# Patient Record
Sex: MALE | Race: WHITE | Employment: UNEMPLOYED | URBAN - METROPOLITAN AREA
[De-identification: names, ages, dates, MRNs, and addresses within clinical notes are randomized per-mention and may not be internally consistent; named-entity substitution may affect disease eponyms.]

---

## 2021-02-08 PROCEDURE — 99284 EMERGENCY DEPT VISIT MOD MDM: CPT

## 2021-02-09 ENCOUNTER — HOSPITAL ENCOUNTER (EMERGENCY)
Age: 26
Discharge: PSYCHIATRIC OR SUBSTANCE USE FACILITY | End: 2021-02-09
Attending: EMERGENCY MEDICINE

## 2021-02-09 VITALS
SYSTOLIC BLOOD PRESSURE: 111 MMHG | BODY MASS INDEX: 19.5 KG/M2 | OXYGEN SATURATION: 97 % | WEIGHT: 144 LBS | RESPIRATION RATE: 18 BRPM | TEMPERATURE: 98.2 F | HEIGHT: 72 IN | HEART RATE: 98 BPM | DIASTOLIC BLOOD PRESSURE: 69 MMHG

## 2021-02-09 DIAGNOSIS — R45.851 SUICIDAL IDEATION: Primary | ICD-10-CM

## 2021-02-09 LAB
ALBUMIN SERPL-MCNC: 4.5 G/DL (ref 3.4–5)
ALBUMIN/GLOB SERPL: 1.4 {RATIO} (ref 0.8–1.7)
ALP SERPL-CCNC: 79 U/L (ref 45–117)
ALT SERPL-CCNC: 29 U/L (ref 16–61)
AMPHET UR QL SCN: NEGATIVE
ANION GAP SERPL CALC-SCNC: 3 MMOL/L (ref 3–18)
AST SERPL-CCNC: 12 U/L (ref 10–38)
BARBITURATES UR QL SCN: NEGATIVE
BASOPHILS # BLD: 0 K/UL (ref 0–0.1)
BASOPHILS NFR BLD: 1 % (ref 0–2)
BENZODIAZ UR QL: NEGATIVE
BILIRUB SERPL-MCNC: 0.4 MG/DL (ref 0.2–1)
BUN SERPL-MCNC: 14 MG/DL (ref 7–18)
BUN/CREAT SERPL: 14 (ref 12–20)
CALCIUM SERPL-MCNC: 8.9 MG/DL (ref 8.5–10.1)
CANNABINOIDS UR QL SCN: NEGATIVE
CHLORIDE SERPL-SCNC: 106 MMOL/L (ref 100–111)
CO2 SERPL-SCNC: 31 MMOL/L (ref 21–32)
COCAINE UR QL SCN: NEGATIVE
COVID-19 RAPID TEST, COVR: NOT DETECTED
CREAT SERPL-MCNC: 1 MG/DL (ref 0.6–1.3)
DIFFERENTIAL METHOD BLD: NORMAL
EOSINOPHIL # BLD: 0.2 K/UL (ref 0–0.4)
EOSINOPHIL NFR BLD: 3 % (ref 0–5)
ERYTHROCYTE [DISTWIDTH] IN BLOOD BY AUTOMATED COUNT: 11.9 % (ref 11.6–14.5)
ETHANOL SERPL-MCNC: <3 MG/DL (ref 0–3)
GLOBULIN SER CALC-MCNC: 3.2 G/DL (ref 2–4)
GLUCOSE SERPL-MCNC: 83 MG/DL (ref 74–99)
HCT VFR BLD AUTO: 41 % (ref 36–48)
HDSCOM,HDSCOM: NORMAL
HGB BLD-MCNC: 14.7 G/DL (ref 13–16)
LYMPHOCYTES # BLD: 2.1 K/UL (ref 0.9–3.6)
LYMPHOCYTES NFR BLD: 39 % (ref 21–52)
MCH RBC QN AUTO: 30.4 PG (ref 24–34)
MCHC RBC AUTO-ENTMCNC: 35.9 G/DL (ref 31–37)
MCV RBC AUTO: 84.7 FL (ref 74–97)
METHADONE UR QL: NEGATIVE
MONOCYTES # BLD: 0.4 K/UL (ref 0.05–1.2)
MONOCYTES NFR BLD: 7 % (ref 3–10)
NEUTS SEG # BLD: 2.6 K/UL (ref 1.8–8)
NEUTS SEG NFR BLD: 50 % (ref 40–73)
OPIATES UR QL: NEGATIVE
PCP UR QL: NEGATIVE
PLATELET # BLD AUTO: 219 K/UL (ref 135–420)
PMV BLD AUTO: 9.7 FL (ref 9.2–11.8)
POTASSIUM SERPL-SCNC: 4 MMOL/L (ref 3.5–5.5)
PROT SERPL-MCNC: 7.7 G/DL (ref 6.4–8.2)
RBC # BLD AUTO: 4.84 M/UL (ref 4.7–5.5)
SARS-COV-2, COV2: NORMAL
SODIUM SERPL-SCNC: 140 MMOL/L (ref 136–145)
SOURCE, COVRS: NORMAL
WBC # BLD AUTO: 5.3 K/UL (ref 4.6–13.2)

## 2021-02-09 PROCEDURE — 80053 COMPREHEN METABOLIC PANEL: CPT

## 2021-02-09 PROCEDURE — 87635 SARS-COV-2 COVID-19 AMP PRB: CPT

## 2021-02-09 PROCEDURE — 85025 COMPLETE CBC W/AUTO DIFF WBC: CPT

## 2021-02-09 PROCEDURE — 80307 DRUG TEST PRSMV CHEM ANLYZR: CPT

## 2021-02-09 PROCEDURE — 82077 ASSAY SPEC XCP UR&BREATH IA: CPT

## 2021-02-09 NOTE — ED NOTES
TRANSFER - OUT REPORT:    Verbal report given to [employer] (name) on Rhoda Duarte  being transferred to [facility] (unit) for routine progression of care       Report consisted of patients Situation, Background, Assessment and   Recommendations(SBAR). Information from the following report(s) SBAR, ED Summary, Intake/Output, MAR, Recent Results, Alarm Parameters  and Quality Measures was reviewed with the receiving nurse. Lines:       Opportunity for questions and clarification was provided.       Patient transported with:   Byron Mcpherson

## 2021-02-09 NOTE — BSMART NOTE
Spoke with patient. Patient continues to endorse SI. Discussed with patient CSU and patient is voluntary. Previous crisis worker discussed with [facility] on call and patient will be evaluated for CSU.

## 2021-02-09 NOTE — ED TRIAGE NOTES
C/o suicidal ideations and plan tonight. States he always thinks about it but tonight he was going to hang himself with rope that he has at home.  Called [employer] and police came and took him to the ER

## 2021-02-09 NOTE — ED NOTES
Video monitor placed in pt room and  display in view at nurses station.   Pt sticker placed on Suicide Awareness/Precautions Observation Flowsheet

## 2021-02-09 NOTE — BSMART NOTE
Patient seen in [place] [address] at the request of Dr.R. Baldo Desai. Patient reports he called the [facility] hotline regarding suicidal ideations and depression. \"I planned on killing myself. \" He reports the police found him pacing at the [facility]. Patient endorsed suicidal ideations with plan to hang himself. \" I was going to use a noose. \"  He reports he was fired a couple of weeks ago from his job. Although he contemplated suicide three week ago, losing his job intensified his ideations. He denies outpatient or inpatient treatment. He denies medical history. Patient reports drinking wine occasionally. He reports access to knives however he denied legal issues. Mental Status Exam 
  
The patient's appearance appropriate. The patient's behavior guarded. The patient is oriented to person, place, time and situation. The patient's speech is normal. The patient's mood is depressed. The patient's range of affect is flat. The patient's thought content demonstrates no evidence of impairment. The patient's thought process is blaming self. \"I just don't want to be myself anymore. The patient's memory shows no evidence of impairment. The patient's appetite shows no evidence of impairment. The patient's sleep shows no evidence of impairment. The patient's insight poor. The patient's judgement fair. DISPOSITION: Patient is receptive to voluntary admission. Dr. Maeve Byrnes and charge nurse made aware.

## 2021-02-09 NOTE — ED NOTES
[facility] with [facility] of care. Pt transfer to [facility] by Dr. Paulo Trivedi.   Contact number for report [phone]

## 2021-02-09 NOTE — ED NOTES
Bedside shift change report given to [facility] (oncoming nurse) by Sridhar (offgoing nurse). Report included the following information SBAR, ED Summary, Intake/Output, MAR, Recent Results, Alarm Parameters  and Quality Measures. Pt sitting up in bed in no acute distress with unlabored breathing.

## 2021-02-09 NOTE — BSMART NOTE
Crisis Note: Spoke with Ellie Gonzales with [address] regarding referral to [facility]; clinicals faxed awaiting response.

## 2021-02-09 NOTE — ED NOTES
Pt stated that they still have SI with a plan to kill themselves. Became defensive with arms crossed when asked if they were still suicidal and if they had a plan. Pt educated about questions being routine questions but pt did not want to continue with the conversation. Pt given juice and call bell is within reach with door open.

## 2021-02-09 NOTE — BSMART NOTE
Spoke with Mando Otero from [facility], inquired if patient has VA benefits and availability of tranfers to [address] with patient who advised he is a  but was unsure of his benefit status. [facility] and spoke with Braulio Barriga [place] on deferment. Spoke with Mando Otero from [facility] and advised of bed status, patient to be screened for CSU.

## 2021-02-09 NOTE — ED PROVIDER NOTES
EMERGENCY DEPARTMENT HISTORY AND PHYSICAL EXAM    3:26 AM  Date: 2/9/2021  Patient Name: John Cruz    History of Presenting Illness     Chief Complaint   Patient presents with   [address] Problem     wanting to hang himself        History Provided By: Patient    HPI: John Cruz is a 31 y.o. male with no significant past medical problems. Patient is presenting with symptoms of depression and suicidal ideation. Over the past week he has been having suicidal thoughts and plans of hanging himself. He reports that for very long time he is always been feeling depressed and wishing he was dead. He is never seeped psychiatric or mental health evaluation in the past for never been on medications. Recently he lost his job which exacerbated his symptoms. Denies previous suicidal attempts or mental health admission. Admits to drinking alcohol occasionally but no drug use. No physical symptoms. Location:  Severity:  Timing/course: Onset/Duration:     PCP: None    Past History     Past Medical History:  No past medical history on file. Past Surgical History:  No past surgical history on file. Family History:  No family history on file. Social History:  Social History     Tobacco Use    Smoking status: Not on file   Substance Use Topics    Alcohol use: Not on file    Drug use: Not on file       Allergies:  Not on File    Review of Systems   Review of Systems   Psychiatric/Behavioral: Positive for decreased concentration, dysphoric mood, sleep disturbance and suicidal ideas. All other systems reviewed and are negative. Physical Exam     Patient Vitals for the past 12 hrs:   Temp Pulse Resp BP SpO2   02/08/21 2352 98.2 °F (36.8 °C) 94 16 114/77 98 %       Physical Exam  Vitals signs and nursing note reviewed. Constitutional:       Appearance: Normal appearance. HENT:      Head: Normocephalic and atraumatic. Eyes:      Extraocular Movements: Extraocular movements intact. Neck:      Musculoskeletal: Normal range of motion and neck supple. Cardiovascular:      Rate and Rhythm: Normal rate. Pulmonary:      Effort: Pulmonary effort is normal. No respiratory distress. Musculoskeletal: Normal range of motion. General: No deformity. Neurological:      General: No focal deficit present. Mental Status: He is alert and oriented to person, place, and time. Psychiatric:         Attention and Perception: Attention normal.         Mood and Affect: Mood is depressed. Affect is flat. Speech: Speech is delayed. Behavior: Behavior is slowed and withdrawn. Thought Content: Thought content includes suicidal ideation. Cognition and Memory: Cognition normal.         Judgment: Judgment normal.         Diagnostic Study Results     Labs -  Recent Results (from the past 12 hour(s))   CBC WITH AUTOMATED DIFF    Collection Time: 02/09/21 12:09 AM   Result Value Ref Range    WBC 5.3 4.6 - 13.2 K/uL    RBC 4.84 4.70 - 5.50 M/uL    HGB 14.7 13.0 - 16.0 g/dL    HCT 41.0 36.0 - 48.0 %    MCV 84.7 74.0 - 97.0 FL    MCH 30.4 24.0 - 34.0 PG    MCHC 35.9 31.0 - 37.0 g/dL    RDW 11.9 11.6 - 14.5 %    PLATELET 394 771 - 426 K/uL    MPV 9.7 9.2 - 11.8 FL    NEUTROPHILS 50 40 - 73 %    LYMPHOCYTES 39 21 - 52 %    MONOCYTES 7 3 - 10 %    EOSINOPHILS 3 0 - 5 %    BASOPHILS 1 0 - 2 %    ABS. NEUTROPHILS 2.6 1.8 - 8.0 K/UL    ABS. LYMPHOCYTES 2.1 0.9 - 3.6 K/UL    ABS. MONOCYTES 0.4 0.05 - 1.2 K/UL    ABS. EOSINOPHILS 0.2 0.0 - 0.4 K/UL    ABS.  BASOPHILS 0.0 0.0 - 0.1 K/UL    DF AUTOMATED     METABOLIC PANEL, COMPREHENSIVE    Collection Time: 02/09/21 12:09 AM   Result Value Ref Range    Sodium 140 136 - 145 mmol/L    Potassium 4.0 3.5 - 5.5 mmol/L    Chloride 106 100 - 111 mmol/L    CO2 31 21 - 32 mmol/L    Anion gap 3 3.0 - 18 mmol/L    Glucose 83 74 - 99 mg/dL    BUN 14 7.0 - 18 MG/DL    Creatinine 1.00 0.6 - 1.3 MG/DL    BUN/Creatinine ratio 14 12 - 20      GFR est AA >60 >60 ml/min/1.73m2    GFR est non-AA >60 >60 ml/min/1.73m2    Calcium 8.9 8.5 - 10.1 MG/DL    Bilirubin, total 0.4 0.2 - 1.0 MG/DL    ALT (SGPT) 29 16 - 61 U/L    AST (SGOT) 12 10 - 38 U/L    Alk. phosphatase 79 45 - 117 U/L    Protein, total 7.7 6.4 - 8.2 g/dL    Albumin 4.5 3.4 - 5.0 g/dL    Globulin 3.2 2.0 - 4.0 g/dL    A-G Ratio 1.4 0.8 - 1.7     ETHYL ALCOHOL    Collection Time: 02/09/21 12:09 AM   Result Value Ref Range    ALCOHOL(ETHYL),SERUM <3 0 - 3 MG/DL       Radiologic Studies -   No results found. Medical Decision Making     ED Course: Progress Notes, Reevaluation, and Consults:    3:26 AM Initial assessment performed. The patients presenting problems have been discussed, and they/their family are in agreement with the care plan formulated and outlined with them. I have encouraged them to ask questions as they arise throughout their visit. 3:27 AM  Crisis aware    5:12 AM  Patient has been evaluated and will be admitted    Provider Notes (Medical Decision Making): 24-year-old male presenting with symptoms of depression and suicidal ideation with a plan. Patient is well-appearing on exam and not in distress however appears depressed and speaks slowly. Patient is likely high risk for suicidality because he had a distinguished plan and he is never seen a psychiatrist has never been on medication before. Screening labs and crisis evaluation. Patient will likely need to be admitted. Procedures:     Critical Care Time:     Vital Signs-Reviewed the patient's vital signs. Reviewed pt's pulse ox reading. EKG: Interpreted by the EP. Time Interpreted:    Rate:    Rhythm:    Interpretation:   Comparison:     Records Reviewed: Nursing Notes (Time of Review: 3:26 AM)  -I am the first provider for this patient.  -I reviewed the vital signs, available nursing notes, past medical history, past surgical history, family history and social history.          Clinical Impression     Clinical Impression: No diagnosis found. Disposition: Admit        This note was dictated utilizing voice recognition software which may lead to typographical errors. I apologize in advance if the situation occurs. If questions arise please do not hesitate to contact me or call our department.     Ely Owen MD  3:26 AM

## 2021-02-10 NOTE — ED NOTES
Video monitor placed in room and  viewer placed at nursing station.  Pt activity logged on Suicide Awareness/Precaution Observation Flowsheet

## 2021-02-10 NOTE — ED NOTES
[facility] and [facility] given to BLS transport to accepting facility. Report called per Thony Hernandez RN. VSS. Pt has belongings.  Stable for dispo